# Patient Record
Sex: FEMALE | Race: WHITE | URBAN - METROPOLITAN AREA
[De-identification: names, ages, dates, MRNs, and addresses within clinical notes are randomized per-mention and may not be internally consistent; named-entity substitution may affect disease eponyms.]

---

## 2024-09-11 ENCOUNTER — APPOINTMENT (RX ONLY)
Dept: URBAN - METROPOLITAN AREA CLINIC 281 | Facility: CLINIC | Age: 24
Setting detail: DERMATOLOGY
End: 2024-09-11

## 2024-09-11 DIAGNOSIS — L71.0 PERIORAL DERMATITIS: ICD-10-CM

## 2024-09-11 DIAGNOSIS — D22 MELANOCYTIC NEVI: ICD-10-CM

## 2024-09-11 PROBLEM — D22.4 MELANOCYTIC NEVI OF SCALP AND NECK: Status: ACTIVE | Noted: 2024-09-11

## 2024-09-11 PROCEDURE — ? COUNSELING

## 2024-09-11 PROCEDURE — ? ADDITIONAL NOTES

## 2024-09-11 PROCEDURE — 99203 OFFICE O/P NEW LOW 30 MIN: CPT

## 2024-09-11 PROCEDURE — ? PRESCRIPTION

## 2024-09-11 RX ORDER — METRONIDAZOLE 7.5 MG/G
CREAM TOPICAL
Qty: 45 | Refills: 1 | Status: ERX | COMMUNITY
Start: 2024-09-11

## 2024-09-11 RX ADMIN — METRONIDAZOLE: 7.5 CREAM TOPICAL at 00:00

## 2024-09-11 ASSESSMENT — LOCATION SIMPLE DESCRIPTION DERM
LOCATION SIMPLE: SCALP
LOCATION SIMPLE: LEFT LIP
LOCATION SIMPLE: RIGHT LIP

## 2024-09-11 ASSESSMENT — LOCATION DETAILED DESCRIPTION DERM
LOCATION DETAILED: LEFT NASOLABIAL FOLD
LOCATION DETAILED: RIGHT NASOLABIAL FOLD
LOCATION DETAILED: LEFT CENTRAL PARIETAL SCALP

## 2024-09-11 ASSESSMENT — LOCATION ZONE DERM
LOCATION ZONE: SCALP
LOCATION ZONE: LIP

## 2024-09-11 NOTE — PROCEDURE: ADDITIONAL NOTES
Additional Notes: Patient is going to think about getting a shave removal or to get excision with plastic surgeon
Detail Level: Simple
Render Risk Assessment In Note?: no